# Patient Record
Sex: FEMALE | Race: WHITE | NOT HISPANIC OR LATINO | Employment: UNEMPLOYED | URBAN - METROPOLITAN AREA
[De-identification: names, ages, dates, MRNs, and addresses within clinical notes are randomized per-mention and may not be internally consistent; named-entity substitution may affect disease eponyms.]

---

## 2017-11-06 ENCOUNTER — OFFICE VISIT (OUTPATIENT)
Dept: OBSTETRICS AND GYNECOLOGY | Facility: CLINIC | Age: 57
End: 2017-11-06

## 2017-11-06 VITALS
BODY MASS INDEX: 27.82 KG/M2 | WEIGHT: 167 LBS | HEIGHT: 65 IN | DIASTOLIC BLOOD PRESSURE: 80 MMHG | SYSTOLIC BLOOD PRESSURE: 124 MMHG

## 2017-11-06 DIAGNOSIS — Z12.31 SCREENING MAMMOGRAM, ENCOUNTER FOR: ICD-10-CM

## 2017-11-06 DIAGNOSIS — N95.1 MENOPAUSAL SYMPTOMS: ICD-10-CM

## 2017-11-06 DIAGNOSIS — Z01.419 WELL WOMAN EXAM WITH ROUTINE GYNECOLOGICAL EXAM: Primary | ICD-10-CM

## 2017-11-06 PROCEDURE — 99396 PREV VISIT EST AGE 40-64: CPT | Performed by: NURSE PRACTITIONER

## 2017-11-06 RX ORDER — ESTRADIOL AND NORETHINDRONE ACETATE 1; .5 MG/1; MG/1
1 TABLET ORAL DAILY
Qty: 30 TABLET | Refills: 12 | Status: SHIPPED | OUTPATIENT
Start: 2017-11-06 | End: 2018-11-16

## 2017-11-06 NOTE — PROGRESS NOTES
[facility]  Routine Annual Visit    2017    Patient: Leti Wallace          MR#:[id number]      History of Present Illness    57 y.o. female  who presents for annual exam. She is a former Dr. Hendricks patient. Last pap 2016 negative, HPV negative. Last mammogram benign 2016. She is postmenopausal- no bleeding. Colonoscopy recently per pt. She requests a refill on her HRT. She states she has attempted to wean off several times and severe hot flashes return, interfering with daily life. She has no complaints today. She has 3 granddaughters and 1 on the way- gender is a surprise.    No LMP recorded. Patient is not currently having periods (Reason: Perimenopausal).  Obstetric History:  OB History      Para Term  AB Living    2 2 2   2    SAB TAB Ectopic Multiple Live Births        2         Menstrual History:     No LMP recorded. Patient is not currently having periods (Reason: Perimenopausal).       Sexual History:       ________________________________________  There is no problem list on file for this patient.      Past Medical History:   Diagnosis Date   • Diabetes mellitus    • Hyperlipidemia    • Thyroid disease    • Uterine polyp        Past Surgical History:   Procedure Laterality Date   • BREAST SURGERY      DR. [facility] / W[facility]HOUSE   • COLONOSCOPY     • ENDOMETRIAL BIOPSY  10/29/2014    RUCHI   • HYSTEROSCOPY W/ POLYPECTOMY  2014    MYOSURE   • TUBAL ABDOMINAL LIGATION         History   Smoking Status   • Never Smoker   Smokeless Tobacco   • Never Used       Family History   Problem Relation Age of Onset   • Stroke Mother    • Hypertension Mother    • Diabetes Mother    • Hypertension Sister    • Colon cancer Brother 50   • Hypertension Brother        Prior to Admission medications    Medication Sig Start Date End Date Taking? Authorizing Provider   atorvastatin (LIPITOR) 10 MG tablet  10/17/16  Yes Historical Provider, MD   citalopram  "(CeleXA) 10 MG tablet Take 10 mg by mouth daily.   Yes Historical Provider, MD   estradiol-norethindrone (MIMVEY) 1-0.5 MG per tablet Take 1 tablet by mouth Daily. 11/1/16  Yes Loida Hendricks MD   Levothyroxine Sodium (SYNTHROID PO) Take  by mouth.   Yes Historical Provider, MD   PROPRANOLOL HCL PO Take  by mouth.   Yes Historical Provider, MD     ________________________________________    Current contraception: post menopausal status  History of abnormal Pap smear: no  Family history of uterine or ovarian cancer: no  Family History of colon cancer/colon polyps: yes - brother; 50  History of abnormal mammogram: no  History of abnormal lipids: yes - on lipitor    The following portions of the patient's history were reviewed and updated as appropriate: allergies, current medications, past family history, past medical history, past social history, past surgical history and problem list.    Review of Systems   Constitutional: Negative.    HENT: Negative.    Eyes: Negative for visual disturbance.   Respiratory: Negative for cough, shortness of breath and wheezing.    Cardiovascular: Negative for chest pain, palpitations and leg swelling.   Gastrointestinal: Negative for abdominal distention, abdominal pain, blood in stool, constipation, diarrhea, nausea and vomiting.   Endocrine: Negative for cold intolerance and heat intolerance.   Genitourinary: Negative for difficulty urinating, dyspareunia, dysuria, frequency, genital sores, hematuria, menstrual problem, pelvic pain, urgency, vaginal bleeding, vaginal discharge and vaginal pain.   Musculoskeletal: Negative.    Skin: Negative.    Neurological: Negative for dizziness, weakness, light-headedness, numbness and headaches.   Hematological: Negative.    Psychiatric/Behavioral: Negative.    Breasts: negative for lumps skin changes, dimpling, swelling, nipple changes/discharge bilaterally       Objective   Physical Exam    /80  Ht 65\" (165.1 cm)  Wt 167 " "lb (75.8 kg)  Breastfeeding? No  BMI 27.79 kg/m2   BP Readings from Last 3 Encounters:   11/06/17 124/80   11/01/16 122/80      Wt Readings from Last 3 Encounters:   11/06/17 167 lb (75.8 kg)   11/01/16 167 lb (75.8 kg)        BMI: Estimated body mass index is 27.79 kg/(m^2) as calculated from the following:    Height as of this encounter: 65\" (165.1 cm).    Weight as of this encounter: 167 lb (75.8 kg).    General:   alert, appears stated age and cooperative   Heart: regular rate and rhythm, S1, S2 normal, no murmur, click, rub or gallop   Lungs: clear to auscultation bilaterally   Abdomen: soft, non-tender, without masses or organomegaly   Breast: inspection negative, no nipple discharge or bleeding, no masses or nodularity palpable   Vulva: normal   Vagina: normal mucosa, normal discharge   Cervix: no bleeding following Pap, no cervical motion tenderness and no lesions   Uterus: normal size, mobile, non-tender or normal shape and consistency   Adnexa: no mass, fullness, tenderness     Assessment:    1. Normal gynecological exam   2. Healthy lifestyle modifications discussed, counseled on self breast exams and bone health  3. HRT- long discussion regarding risks associated with hormone replacement therapy. Pt made aware it is recommended she be on lowest dose for shortest amount of time. She was offered lower dose and also offered transdermal route but Leti refuses both for now. She will try to wean off again in the next several months.      Plan:    []  Rx:   [x]  Mammogram request made  [x]  PAP done  []  Occult fecal blood test ([facility])  []  Labs:   []  GC/Chl/TV  []  DEXA scan   []  Referral for colonoscopy:           ABDIRAHMAN Marcelino  11/6/2017 11:54 AM  "

## 2017-11-08 ENCOUNTER — TELEPHONE (OUTPATIENT)
Dept: OBSTETRICS AND GYNECOLOGY | Facility: CLINIC | Age: 57
End: 2017-11-08

## 2017-11-08 LAB
CYTOLOGIST CVX/VAG CYTO: NORMAL
CYTOLOGY CVX/VAG DOC THIN PREP: NORMAL
DX ICD CODE: NORMAL
HIV 1 & 2 AB SER-IMP: NORMAL
HPV I/H RISK 4 DNA CVX QL PROBE+SIG AMP: NEGATIVE
OTHER STN SPEC: NORMAL
PATH REPORT.FINAL DX SPEC: NORMAL
STAT OF ADQ CVX/VAG CYTO-IMP: NORMAL

## 2017-11-08 NOTE — TELEPHONE ENCOUNTER
----- Message from ABDIRAHMAN Mcfadden sent at 11/8/2017  4:16 PM EST -----  Please inform patient her pap smear returned negative (normal). Thank you.

## 2018-02-28 ENCOUNTER — APPOINTMENT (OUTPATIENT)
Dept: WOMENS IMAGING | Facility: HOSPITAL | Age: 58
End: 2018-02-28

## 2018-02-28 PROCEDURE — 77063 BREAST TOMOSYNTHESIS BI: CPT | Performed by: RADIOLOGY

## 2018-02-28 PROCEDURE — 77067 SCR MAMMO BI INCL CAD: CPT | Performed by: RADIOLOGY

## 2018-11-16 ENCOUNTER — OFFICE VISIT (OUTPATIENT)
Dept: OBSTETRICS AND GYNECOLOGY | Age: 58
End: 2018-11-16

## 2018-11-16 VITALS
SYSTOLIC BLOOD PRESSURE: 130 MMHG | WEIGHT: 161 LBS | HEIGHT: 65 IN | BODY MASS INDEX: 26.82 KG/M2 | DIASTOLIC BLOOD PRESSURE: 88 MMHG

## 2018-11-16 DIAGNOSIS — Z01.419 WELL WOMAN EXAM WITH ROUTINE GYNECOLOGICAL EXAM: Primary | ICD-10-CM

## 2018-11-16 DIAGNOSIS — Z12.31 SCREENING MAMMOGRAM, ENCOUNTER FOR: ICD-10-CM

## 2018-11-16 DIAGNOSIS — N95.2 ATROPHIC VAGINITIS: ICD-10-CM

## 2018-11-16 PROCEDURE — 99396 PREV VISIT EST AGE 40-64: CPT | Performed by: NURSE PRACTITIONER

## 2018-11-16 RX ORDER — ESTRADIOL 0.1 MG/G
0.5 CREAM VAGINAL 3 TIMES WEEKLY
Qty: 42.5 G | Refills: 6 | Status: SHIPPED | OUTPATIENT
Start: 2018-11-16

## 2018-11-16 NOTE — PROGRESS NOTES
[facility]  Routine Annual Visit    2018    Patient: Leti Wallace          MR#:[id number]      History of Present Illness    58 y.o. female  who presents for annual exam. Pap negative, HPV negative 2017. Mammogram benign 2018 at [facility]. Leti is postmenopausal- no bleeding. She discontinued her mimvey in March. No vasomotor symptoms but does report painful intercourse/vaginal dryness and trouble sleeping. She sees Dr. Gay for primary care and he is working on her sleep. Reports up to date on colonoscopy- gets every 5 years. Three grand daughters doing well! No complaints today.       No LMP recorded. Patient is perimenopausal.  Obstetric History:  OB History      Para Term  AB Living    2 2 2     2    SAB TAB Ectopic Molar Multiple Live Births              2         Menstrual History:     No LMP recorded. Patient is perimenopausal.       Sexual History:       ________________________________________  There is no problem list on file for this patient.      Past Medical History:   Diagnosis Date   • Hyperlipidemia    • Thyroid disease    • Uterine polyp        Past Surgical History:   Procedure Laterality Date   • BREAST SURGERY      DR. [facility]ATER[facility]SE / WATERHOUSE   • COLONOSCOPY     • ENDOMETRIAL BIOPSY  10/29/2014    RUCHI   • HYSTEROSCOPY W/ POLYPECTOMY  2014    MYOSURE   • TUBAL ABDOMINAL LIGATION         Social History     Tobacco Use   Smoking Status Never Smoker   Smokeless Tobacco Never Used       Family History   Problem Relation Age of Onset   • Stroke Mother    • Hypertension Mother    • Diabetes Mother    • Hypertension Sister    • Colon cancer Brother 50   • Hypertension Brother        Prior to Admission medications    Medication Sig Start Date End Date Taking? Authorizing Provider   atorvastatin (LIPITOR) 10 MG tablet  10/17/16  Yes Provider, MD Cony   citalopram (CeleXA) 10 MG tablet Take 10 mg by mouth daily.   Yes Provider,  "Historical, MD   Levothyroxine Sodium (SYNTHROID PO) Take  by mouth.   Yes Provider, Historical, MD   PROPRANOLOL HCL PO Take  by mouth.   Yes Provider, Historical, MD   estradiol-norethindrone (MIMVEY) 1-0.5 MG per tablet Take 1 tablet by mouth Daily. 11/6/17   Felicity Del Toro APRN     ________________________________________    The following portions of the patient's history were reviewed and updated as appropriate: allergies, current medications, past family history, past medical history, past social history, past surgical history and problem list.    Review of Systems   Constitutional: Negative.    HENT: Negative.    Eyes: Negative for visual disturbance.   Respiratory: Negative for cough, shortness of breath and wheezing.    Cardiovascular: Negative for chest pain, palpitations and leg swelling.   Gastrointestinal: Negative for abdominal distention, abdominal pain, blood in stool, constipation, diarrhea, nausea and vomiting.   Endocrine: Negative for cold intolerance and heat intolerance.   Genitourinary: Positive for dyspareunia. Negative for difficulty urinating, dysuria, frequency, genital sores, hematuria, menstrual problem, pelvic pain, urgency, vaginal bleeding, vaginal discharge and vaginal pain.   Musculoskeletal: Negative.    Skin: Negative.    Neurological: Negative for dizziness, weakness, light-headedness, numbness and headaches.   Hematological: Negative.    Psychiatric/Behavioral: Negative.    Breasts: negative for lumps skin changes, dimpling, swelling, nipple changes/discharge bilaterally         Objective   Physical Exam    /88   Ht 165.1 cm (65\")   Wt 73 kg (161 lb)   BMI 26.79 kg/m²    BP Readings from Last 3 Encounters:   11/16/18 130/88   11/06/17 124/80   11/01/16 122/80      Wt Readings from Last 3 Encounters:   11/16/18 73 kg (161 lb)   11/06/17 75.8 kg (167 lb)   11/01/16 75.8 kg (167 lb)        BMI: Estimated body mass index is 26.79 kg/m² as calculated from the following:    " "Height as of this encounter: 165.1 cm (65\").    Weight as of this encounter: 73 kg (161 lb).      General:   alert, appears stated age and cooperative   Heart: regular rate and rhythm, S1, S2 normal, no murmur, click, rub or gallop   Lungs: clear to auscultation bilaterally   Abdomen: soft, non-tender, without masses or organomegaly   Breast: inspection negative, no nipple discharge or bleeding, no masses or nodularity palpable   Vulva: normal   Vagina: normal mucosa, normal discharge, atrophic   Cervix: no cervical motion tenderness and no lesions   Uterus: normal size, mobile, non-tender or normal shape and consistency   Adnexa: no mass, fullness, tenderness     Assessment:    1. Normal annual exam   2. Vaginal atrophy/dyspareunia- options discussed with Leti; she desires vaginal estrogen replacement- R/B/A and proper use discussed with patient in detail  3. Healthy lifestyle modifications discussed, counseled on self breast exams and bone health       Plan:    [x]  Rx: estrace   [x]  Mammogram request made  [x]  PAP done  []  Occult fecal blood test ([facility]ure)  []  Labs:   []  GC/Chl/TV  []  DEXA scan   []  Referral for colonoscopy:           ABDIRAHMAN Marcelino  11/16/2018 9:50 AM  "

## 2018-11-21 LAB
CYTOLOGIST CVX/VAG CYTO: NORMAL
CYTOLOGY CVX/VAG DOC THIN PREP: NORMAL
DX ICD CODE: NORMAL
HIV 1 & 2 AB SER-IMP: NORMAL
HPV I/H RISK 4 DNA CVX QL PROBE+SIG AMP: NEGATIVE
Lab: NORMAL
OTHER STN SPEC: NORMAL
PATH REPORT.FINAL DX SPEC: NORMAL
STAT OF ADQ CVX/VAG CYTO-IMP: NORMAL

## 2018-11-26 ENCOUNTER — TELEPHONE (OUTPATIENT)
Dept: OBSTETRICS AND GYNECOLOGY | Age: 58
End: 2018-11-26

## 2018-11-26 NOTE — TELEPHONE ENCOUNTER
----- Message from ABDIRAHMAN Mcfadden sent at 11/26/2018 10:43 AM EST -----  Please inform patient her pap smear returned negative (normal). Thank you.

## 2024-12-30 ENCOUNTER — TELEPHONE (OUTPATIENT)
Dept: FAMILY MEDICINE CLINIC | Facility: CLINIC | Age: 64
End: 2024-12-30
Payer: COMMERCIAL